# Patient Record
Sex: MALE
[De-identification: names, ages, dates, MRNs, and addresses within clinical notes are randomized per-mention and may not be internally consistent; named-entity substitution may affect disease eponyms.]

---

## 2018-04-13 VITALS — WEIGHT: 170.02 LBS

## 2018-05-11 ENCOUNTER — RECORD ABSTRACTING (OUTPATIENT)
Age: 23
End: 2018-05-11

## 2018-05-11 DIAGNOSIS — K64.9 UNSPECIFIED HEMORRHOIDS: ICD-10-CM

## 2018-05-11 DIAGNOSIS — M67.432 GANGLION, LEFT WRIST: ICD-10-CM

## 2018-05-11 DIAGNOSIS — F41.9 ANXIETY DISORDER, UNSPECIFIED: ICD-10-CM

## 2018-05-11 PROBLEM — Z00.00 ENCOUNTER FOR PREVENTIVE HEALTH EXAMINATION: Status: ACTIVE | Noted: 2018-05-11

## 2018-05-11 RX ORDER — LORATADINE 5 MG
TABLET,CHEWABLE ORAL
Refills: 0 | Status: ACTIVE | COMMUNITY

## 2019-04-15 ENCOUNTER — APPOINTMENT (OUTPATIENT)
Dept: PEDIATRICS | Facility: CLINIC | Age: 24
End: 2019-04-15
Payer: COMMERCIAL

## 2019-04-15 VITALS
WEIGHT: 154.6 LBS | DIASTOLIC BLOOD PRESSURE: 78 MMHG | HEART RATE: 64 BPM | TEMPERATURE: 98.5 F | SYSTOLIC BLOOD PRESSURE: 128 MMHG

## 2019-04-15 DIAGNOSIS — Z87.898 PERSONAL HISTORY OF OTHER SPECIFIED CONDITIONS: ICD-10-CM

## 2019-04-15 DIAGNOSIS — R10.30 LOWER ABDOMINAL PAIN, UNSPECIFIED: ICD-10-CM

## 2019-04-15 DIAGNOSIS — Z87.19 PERSONAL HISTORY OF OTHER DISEASES OF THE DIGESTIVE SYSTEM: ICD-10-CM

## 2019-04-15 PROCEDURE — 99213 OFFICE O/P EST LOW 20 MIN: CPT

## 2019-04-15 RX ORDER — ASCORBIC ACID 500 MG
TABLET ORAL
Refills: 0 | Status: ACTIVE | COMMUNITY

## 2019-04-15 RX ORDER — BACILLUS COAGULANS/INULIN 1B-250 MG
CAPSULE ORAL
Refills: 0 | Status: COMPLETED | COMMUNITY
End: 2019-04-15

## 2019-04-15 RX ORDER — ACETAMINOPHEN 325 MG
TABLET ORAL
Refills: 0 | Status: ACTIVE | COMMUNITY

## 2019-04-15 NOTE — BEGINNING OF VISIT
[FreeTextEntry1] : 25 yo boy here for clearance to study abroad in [place] and [place]. He also has a pain in the left lower groin/testicle for 3 days noted after swimming hard. It is without pain today. took diltiazam rectallly for rectal fissure. also has pelvic floor muscle tightness

## 2019-04-15 NOTE — DISCUSSION/SUMMARY
[FreeTextEntry1] : 23 yo man with possible left inguinal hernia vs muscle pull. See urologist for evaluation. Paperwork signed to clear him to study abroad. Should reduce caffeine in diet. Time to move on to adult doctor. Immunizations given to patient. he feels that he is in a good place mentally and is happy.

## 2019-04-15 NOTE — PHYSICAL EXAM
[NL] : soft, non tender, non distended, normal bowel sounds, no hepatosplenomegaly [Rubio: ____] : Rubio [unfilled] [Uncircumcised] : uncircumcised [FreeTextEntry6] : Left groin: on hernia exam he has some tenderness at distal ring. no bulge palpated. some tenderness to left  supra inguinal area but no bulge. no testicular masses or hernia in scrotum.

## 2019-04-15 NOTE — HISTORY OF PRESENT ILLNESS
[FreeTextEntry6] : drinks 2 to 3 cups coffee with caffeine per day and drinks alcohol one time each week up to 4 drinks. Is swimming and is on a non strict vegetarian diet. Will study in [place] and [place] over the summer and in the fall semester.Feels well. Currently not  seein a therapist for depression but had seen them in 2018. Feels well and is happy.